# Patient Record
Sex: MALE | Race: WHITE | NOT HISPANIC OR LATINO | Employment: UNEMPLOYED
[De-identification: names, ages, dates, MRNs, and addresses within clinical notes are randomized per-mention and may not be internally consistent; named-entity substitution may affect disease eponyms.]

---
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
## 2024-05-29 ENCOUNTER — APPOINTMENT (OUTPATIENT)
Dept: GENERAL RADIOLOGY | Facility: CLINIC | Age: 24
End: 2024-05-29
Attending: EMERGENCY MEDICINE
Payer: COMMERCIAL

## 2024-05-29 ENCOUNTER — HOSPITAL ENCOUNTER (EMERGENCY)
Facility: CLINIC | Age: 24
Discharge: HOME | End: 2024-05-30
Attending: EMERGENCY MEDICINE | Admitting: EMERGENCY MEDICINE
Payer: COMMERCIAL

## 2024-05-29 VITALS
WEIGHT: 200 LBS | TEMPERATURE: 98 F | SYSTOLIC BLOOD PRESSURE: 117 MMHG | HEART RATE: 81 BPM | BODY MASS INDEX: 28.63 KG/M2 | RESPIRATION RATE: 16 BRPM | OXYGEN SATURATION: 98 % | DIASTOLIC BLOOD PRESSURE: 76 MMHG | HEIGHT: 70 IN

## 2024-05-29 DIAGNOSIS — S61.411A LACERATION OF RIGHT HAND WITHOUT FOREIGN BODY, INITIAL ENCOUNTER: ICD-10-CM

## 2024-05-29 PROCEDURE — 73130 X-RAY EXAM OF HAND: CPT | Mod: RT

## 2024-05-29 PROCEDURE — 12001 RPR S/N/AX/GEN/TRNK 2.5CM/<: CPT | Performed by: EMERGENCY MEDICINE

## 2024-05-29 PROCEDURE — 99283 EMERGENCY DEPT VISIT LOW MDM: CPT | Performed by: EMERGENCY MEDICINE

## 2024-05-29 ASSESSMENT — ACTIVITIES OF DAILY LIVING (ADL): ADLS_ACUITY_SCORE: 33

## 2024-05-29 ASSESSMENT — COLUMBIA-SUICIDE SEVERITY RATING SCALE - C-SSRS
6. HAVE YOU EVER DONE ANYTHING, STARTED TO DO ANYTHING, OR PREPARED TO DO ANYTHING TO END YOUR LIFE?: NO
2. HAVE YOU ACTUALLY HAD ANY THOUGHTS OF KILLING YOURSELF IN THE PAST MONTH?: NO
1. IN THE PAST MONTH, HAVE YOU WISHED YOU WERE DEAD OR WISHED YOU COULD GO TO SLEEP AND NOT WAKE UP?: NO

## 2024-05-30 NOTE — ED TRIAGE NOTES
Pt states he was putting together a remote control boat when the drill bit slipped puncturing the right palm, bleeding controlled, currently wrapped, last tetanus was 4 years ago.     Triage Assessment (Adult)       Row Name 05/29/24 4016          Triage Assessment    Airway WDL WDL        Respiratory WDL    Respiratory WDL WDL        Skin Circulation/Temperature WDL    Skin Circulation/Temperature WDL WDL        Cardiac WDL    Cardiac WDL WDL        Peripheral/Neurovascular WDL    Peripheral Neurovascular WDL WDL        Cognitive/Neuro/Behavioral WDL    Cognitive/Neuro/Behavioral WDL WDL

## 2024-05-30 NOTE — ED PROVIDER NOTES
"ED Provider Note  [facility]      History     Chief Complaint   Patient presents with    Hand Pain    Hand Injury     HPI  Kalia Jeong is a 23 year old male who is right-hand dominant, presenting the emergency department with right hand laceration.  Patient was attempting to build a boat at home, using a drill, and subsequently the drill went through with the plastic piece he was drilling through, and subsequently entered his right hand on the palmar surface.  Suffered laceration.  No injury elsewhere with the exception of a slight abrasion/superficial laceration to the right thumb.  Tetanus status reviewed and was received 4 years ago.        Independent Historian:      Review of External Notes:          Allergies:  No Known Allergies    Problem List:    There are no problems to display for this patient.       Past Medical History:    No past medical history on file.    Past Surgical History:    Past Surgical History:   Procedure Laterality Date    SURGICAL HISTORY OF -       Bilateral myringotomy with tubes and adenoidectomy.       Family History:    Family History   Problem Relation Age of Onset    Cancer Father     Breast Cancer Maternal Grandmother     Cancer Maternal Grandfather        Social History:  Marital Status:  Single [1]  Social History     Tobacco Use    Smoking status: Never   Substance Use Topics    Alcohol use: No    Drug use: No        Medications:    DIMETAPP ND 10 MG OR TBDP  TRIAMINIC SOFTCHEWS COUGH OR  TYLENOL CHILDRENS MELTAWAYS 80 MG OR TBDP          Review of Systems  A medically appropriate review of systems was performed with pertinent positives and negatives noted in the HPI, and all other systems negative.    Physical Exam   Patient Vitals for the past 24 hrs:   BP Temp Temp src Pulse Resp SpO2 Height Weight   05/29/24 2145 117/76 98  F (36.7  C) Oral 81 16 98 % 1.778 m (5' 10\") 90.7 kg (200 lb)          Physical Exam  General: alert and in no " acute distress on arrival  Head: atraumatic, normocephalic  Lungs:  nonlabored  CV:  extremities warm and perfused  Abd: nondistended  Sk right hand with laceration, triangular in nature of the palmar surface of the right hand, 1.5 cm x 1.5 cm  Neuro: Patient awake, alert, speech is fluent,   Psychiatric: affect/mood normal,        ED Course                 Procedures  [facility] Procedure Note        Laceration Repair:    Performed by: Casey Mcclain MD  Authorized by: Casey Mcclain MD  Consent given by: Patient who states understanding of the procedure being performed after discussing the risks, benefits and alternatives.    Preparation: Patient was prepped and draped in usual sterile fashion.  Irrigation solution: saline    Body area:right hand  Laceration length: 2cm  Contamination: The wound is not contaminated.  Foreign bodies:none  Tendon involvement: none  Anesthesia: Local  Local anesthetic: Bupivacaine 0.5%  Anesthetic total: 3ml    Debridement: none  Skin closure: Closed with 3 x 4.0 Prolene  Technique: interrupted  Approximation: close  Approximation difficulty: simple    Patient tolerance: Patient tolerated the procedure well with no immediate complications.                           Results for orders placed or performed during the hospital encounter of 05/29/24 (from the past 24 hour(s))   XR Hand Right G/E 3 Views    Narrative    EXAM: RIGHT HAND 3 VIEWS  LOCATION: [facility]  DATE/TIME: 5/29/2024 11:24 PM CDT    INDICATION: Puncture wound to the palm with a drill.  COMPARISON: None.      Impression    IMPRESSION:   1. No radiopaque foreign object or gas visualized in the soft tissues of the right hand.  2. No visualized acute fracture or malalignment of the right hand.        MEDICATIONS GIVEN IN THE EMERGENCY DEPARTMENT:  Medications - No data to display        Independent Interpretation (X-rays, CTs, rhythm strip):  No x-ray images personally reviewed.   No evidence of fracture, or foreign body  Consultations/Discussion of Management or Tests:       Social Determinants of Health affecting care:         Assessments & Plan (with Medical Decision Making)  23 year old male who presents to the Emergency Department for evaluation of right hand laceration.  Injury occurred within 1 to 2 hours prior to ED arrival.  Patient right-hand-dominant, with tetanus status reviewed which is up-to-date.  Laceration was cleansed, copiously irrigated, and no evidence of foreign body.  3 simple interrupted sutures were placed.  No indication for tetanus as this is up-to-date.  Follow-up recommended in 7 to 10 days for suture removal, and return precautions discussed.       I have reviewed the nursing notes.    I have reviewed the findings, diagnosis, plan and need for follow up with the patient.       Critical Care time:  none      NEW PRESCRIPTIONS STARTED AT TODAY'S ER VISIT  Discharge Medication List as of 5/30/2024 12:19 AM          Final diagnoses:   Laceration of right hand without foreign body, initial encounter       5/29/2024   [facility] EMERGENCY DEPT       Casey Mcclain MD  05/30/24 0024       Casey Mcclain MD  05/30/24 0026

## 2024-08-25 ENCOUNTER — HEALTH MAINTENANCE LETTER (OUTPATIENT)
Age: 24
End: 2024-08-25